# Patient Record
Sex: FEMALE | Race: WHITE
[De-identification: names, ages, dates, MRNs, and addresses within clinical notes are randomized per-mention and may not be internally consistent; named-entity substitution may affect disease eponyms.]

---

## 2017-06-17 ENCOUNTER — HOSPITAL ENCOUNTER (EMERGENCY)
Dept: HOSPITAL 10 - FTE | Age: 32
Discharge: HOME | End: 2017-06-17
Payer: MEDICAID

## 2017-06-17 VITALS
HEART RATE: 64 BPM | TEMPERATURE: 98.3 F | SYSTOLIC BLOOD PRESSURE: 104 MMHG | RESPIRATION RATE: 16 BRPM | DIASTOLIC BLOOD PRESSURE: 58 MMHG

## 2017-06-17 VITALS — BODY MASS INDEX: 34.18 KG/M2 | WEIGHT: 147.71 LBS | HEIGHT: 55 IN

## 2017-06-17 DIAGNOSIS — O20.9: Primary | ICD-10-CM

## 2017-06-17 DIAGNOSIS — O23.41: ICD-10-CM

## 2017-06-17 DIAGNOSIS — Z3A.10: ICD-10-CM

## 2017-06-17 DIAGNOSIS — R10.2: ICD-10-CM

## 2017-06-17 LAB
ADD SCAN DIFF: NO
ADD UMIC: YES
BACTERIA #/AREA URNS HPF: (no result) /[HPF]
BASOPHILS # BLD AUTO: 0 10^3/UL (ref 0–0.1)
BASOPHILS NFR BLD: 0.2 % (ref 0–2)
COLOR UR: (no result)
EOSINOPHIL # BLD: 0.1 10^3/UL (ref 0–0.5)
EOSINOPHIL NFR BLD: 1.1 % (ref 0–7)
ERYTHROCYTE [DISTWIDTH] IN BLOOD BY AUTOMATED COUNT: 11.5 % (ref 11.5–14.5)
GLUCOSE UR STRIP-MCNC: NEGATIVE %
HCT VFR BLD CALC: 37.5 % (ref 37–47)
HGB BLD-MCNC: 13.3 G/DL (ref 12–16)
KETONES UR STRIP.AUTO-MCNC: NEGATIVE MG/DL
LYMPHOCYTES # BLD AUTO: 3.3 10^3/UL (ref 0.8–2.9)
LYMPHOCYTES NFR BLD AUTO: 27.9 % (ref 15–51)
MCH RBC QN AUTO: 33.3 PG (ref 29–33)
MCHC RBC AUTO-ENTMCNC: 35.5 G/DL (ref 32–37)
MCV RBC AUTO: 93.8 FL (ref 82–101)
MONOCYTES # BLD: 1 10^3/UL (ref 0.3–0.9)
MONOCYTES NFR BLD: 8.5 % (ref 0–11)
NEUTROPHILS # BLD: 7.3 10^3/UL (ref 1.6–7.5)
NEUTROPHILS NFR BLD AUTO: 61.7 % (ref 39–77)
NITRITE UR QL STRIP.AUTO: NEGATIVE
NRBC # BLD MANUAL: 0 10^3/UL (ref 0–0)
NRBC BLD QL: 0 /100WBC (ref 0–0)
PLATELET # BLD: 246 10^3/UL (ref 140–415)
PMV BLD AUTO: 10.2 FL (ref 7.4–10.4)
RBC # BLD AUTO: 4 10^6/UL (ref 4.2–5.4)
RBC # UR AUTO: (no result) /UL
RBC #/AREA URNS HPF: (no result) /HPF
UR BILIRUBIN (DIP): NEGATIVE
UR CLARITY: CLEAR
UR TOTAL PROTEIN (DIP): NEGATIVE
UROBILINOGEN UR STRIP-ACNC: (no result) (ref 0.1–1)
WBC # BLD AUTO: 11.8 10^3/UL (ref 4.8–10.8)
WBC # UR STRIP: (no result) /UL

## 2017-06-17 PROCEDURE — 86901 BLOOD TYPING SEROLOGIC RH(D): CPT

## 2017-06-17 PROCEDURE — 85025 COMPLETE CBC W/AUTO DIFF WBC: CPT

## 2017-06-17 PROCEDURE — 76817 TRANSVAGINAL US OBSTETRIC: CPT

## 2017-06-17 PROCEDURE — 81001 URINALYSIS AUTO W/SCOPE: CPT

## 2017-06-17 PROCEDURE — 86900 BLOOD TYPING SEROLOGIC ABO: CPT

## 2017-06-17 PROCEDURE — 84702 CHORIONIC GONADOTROPIN TEST: CPT

## 2017-06-17 NOTE — RADRPT
PROCEDURE:   US OB. 

 

CLINICAL INDICATION:   Pelvic pain  

 

TECHNIQUE:   Transabdominal and transvaginal views of the pelvis are available for review. 

 

COMPARISON:   No prior studies are available for comparison. 

 

FINDINGS:

Within the uterus, there is a single, live, intrauterine pregnancy.

Crown-rump length:2.8 cm 

Fetal heart rate:161 beats per minute 

Mean gestational sac diameter:  5.18 cm

Ultrasound estimated gestational age:10 weeks 4 days

The estimated date of delivery is January 9, 2018 

No ovarian or adnexal mass lesion is seen. There is no free fluid.  

 

IMPRESSION:

 

1.  Single live intrauterine pregnancy with an estimated gestational age of 10 weeks 4 days.  The es
timated date of delivery is January 9, 2018.  No acute abnormality is present.

 

RPTAT: EE.

_____________________________________________ 

.Maria Luisa Dozier MD, MD           Date    Time 

Electronically viewed and signed by .Maria Luisa Dozier MD, MD on 06/17/2017 20:31 

 

D:  06/17/2017 20:31  T:  06/17/2017 20:31

.F/

## 2017-06-17 NOTE — ERD
ER Documentation


Chief Complaint


Date/Time


DATE: 17 


TIME: 18:38


Chief Complaint


PELVIC PAIN 8 WEEKS PREG





HPI


This is a 32-year-old female who presents to the emergency department today 

complaining of pelvic pain for the past 3 hours and some spotting that started 

this morning.  Patient states she has not taken any medication for the pain.  

States she is approximately 7-8 weeks pregnant.  States that she went to her 

clinic this morning was referred here for further evaluation.  States she has 

some nausea.  Denies any fevers or chills.





ROS


All systems reviewed and are negative except as per history of present illness.





Medications


Home Meds


Active Scripts


Ondansetron Hcl* (Zofran*) 4 Mg Tablet, 4 MG PO Q6H for NAUSEA AND/OR VOMITING, 

#30 TAB


   Prov:NEWTON WOODALL PA-C         17


Acetaminophen* (Tylophen*) 500 Mg Capsule, 1 CAP PO Q6H Y for PAIN AND OR 

ELEVATED TEMP, #30 CAP


   Prov:NEWTON WOODALL PA-C         17


Cephalexin* (Keflex*) 500 Mg Capsule, 500 MG PO QID for 5 Days, CAP


   Prov:NEWOTN WOODALL PA-C         17





PMhx/Soc


Medical and Surgical Hx:  pt denies Medical Hx, pt denies Surgical Hx


Hx Alcohol Use:  No


Hx Substance Use:  No


Hx Tobacco Use:  No


Smoking Status:  Never smoker





Physical Exam


Vitals





Vital Signs








  Date Time  Temp Pulse Resp B/P Pulse Ox O2 Delivery O2 Flow Rate FiO2


 


17 15:18 98.1 79 18 104/66 99   








Physical Exam


Const:     No acute distress


Head:   Atraumatic 


Eyes:    Normal Conjunctiva


ENT:    Normal External Ears, Nose and Mouth.


Neck:               Full range of motion..~ No meningismus.


Resp:    Clear to auscultation bilaterally


Cardio:    Regular rate and rhythm, no murmurs


Abd:    Soft, suprapubic tenderness non distended. Normal bowel sounds.  No 

right lower quadrant pain.  No specific tenderness at McBurney's.


Skin:    No petechiae or rashes


Back:    No midline or flank tenderness.  No CVA tenderness.


Ext:    No cyanosis, or edema


Neur:    Awake and alert


Psych:    Normal Mood and Affect





Procedures/MDM


This is a  32-year-old female who presents to the emergency department 

today complaining of some vaginal spotting and pelvic pain that started earlier 

today.  Patient was referred here by her clinic clinic at [facility] for further 

evaluation.  Given patient's symptoms and history I did obtain a complete OB 

workup.





Laboratory work shows a mildly elevated white blood cell count.  She is not 

anemic.  Platelets are within normal limits.


UA shows moderate leukocyte esterase and moderate blood.  Patient will be given 

a prescription for Keflex to treat a urinary tract infection.


Rh status is pending at time of signout


Beta quant hCG is pending at time of signout


Ultrasound is pending at time of signout





Patient symptoms at this time is consistent with vaginal bleeding in early 

pregnancy as well as urinary tract infection.  Patient will be signed out to 

Danya Downs NP pending results of Rh status, beta quant hCG and ultrasound.





Patient was given Tylenol, Zofran here in the emergency department.  I will 

give her prescription for Tylenol, Zofran and Keflex for home.





Any further orders placed will be placed by Danya Downs NP or the attending 

physician.





Departure


Diagnosis:  


 Primary Impression:  


 Vaginal bleeding in pregnant patient at less than 20 weeks gestation


 Additional Impression:  


 UTI (urinary tract infection)


 Urinary tract infection type:  site unspecified  Hematuria presence:  with 

hematuria  Qualified Code:  N39.0 - Urinary tract infection with hematuria, 

site unspecified


Condition:  NEWTON Silva PA-C 2017 18:42

## 2017-10-01 ENCOUNTER — HOSPITAL ENCOUNTER (OUTPATIENT)
Dept: HOSPITAL 10 - OBT | Age: 32
Discharge: HOME | End: 2017-10-01
Attending: OBSTETRICS & GYNECOLOGY
Payer: MEDICAID

## 2017-10-01 VITALS
BODY MASS INDEX: 30.43 KG/M2 | HEIGHT: 62 IN | WEIGHT: 165.35 LBS | HEIGHT: 62 IN | WEIGHT: 165.35 LBS | BODY MASS INDEX: 30.43 KG/M2

## 2017-10-01 VITALS — SYSTOLIC BLOOD PRESSURE: 102 MMHG | DIASTOLIC BLOOD PRESSURE: 55 MMHG | HEART RATE: 73 BPM | RESPIRATION RATE: 18 BRPM

## 2017-10-01 DIAGNOSIS — Z3A.27: ICD-10-CM

## 2017-10-01 DIAGNOSIS — O21.0: Primary | ICD-10-CM

## 2017-10-01 LAB
ADD UMIC: YES
ALBUMIN SERPL-MCNC: 3.3 G/DL (ref 3.3–4.9)
ALBUMIN/GLOB SERPL: 1.13 {RATIO}
ALP SERPL-CCNC: 66 IU/L (ref 42–121)
ALT SERPL-CCNC: 27 IU/L (ref 13–69)
ANION GAP SERPL CALC-SCNC: 10 MMOL/L (ref 8–16)
AST SERPL-CCNC: 16 IU/L (ref 15–46)
BACTERIA #/AREA URNS HPF: (no result) /HPF
BASOPHILS # BLD AUTO: 0 10^3/UL (ref 0–0.1)
BASOPHILS NFR BLD: 0.1 % (ref 0–2)
BILIRUB DIRECT SERPL-MCNC: 0 MG/DL (ref 0–0.2)
BILIRUB SERPL-MCNC: 0.3 MG/DL (ref 0.2–1.3)
BUN SERPL-MCNC: 6 MG/DL (ref 7–20)
CALCIUM SERPL-MCNC: 8.3 MG/DL (ref 8.4–10.2)
CHLORIDE SERPL-SCNC: 108 MMOL/L (ref 97–110)
CO2 SERPL-SCNC: 24 MMOL/L (ref 21–31)
COLOR UR: (no result)
CREAT SERPL-MCNC: 0.5 MG/DL (ref 0.44–1)
EOSINOPHIL # BLD: 0.1 10^3/UL (ref 0–0.5)
EOSINOPHIL NFR BLD: 0.8 % (ref 0–7)
ERYTHROCYTE [DISTWIDTH] IN BLOOD BY AUTOMATED COUNT: 12.3 % (ref 11.5–14.5)
GLOBULIN SER-MCNC: 2.9 G/DL (ref 1.3–3.2)
GLUCOSE SERPL-MCNC: 79 MG/DL (ref 70–220)
GLUCOSE UR STRIP-MCNC: NEGATIVE MG/DL
HCT VFR BLD CALC: 33 % (ref 37–47)
HGB BLD-MCNC: 11.4 G/DL (ref 12–16)
KETONES UR STRIP.AUTO-MCNC: NEGATIVE MG/DL
LYMPHOCYTES # BLD AUTO: 2.1 10^3/UL (ref 0.8–2.9)
LYMPHOCYTES NFR BLD AUTO: 19 % (ref 15–51)
MCH RBC QN AUTO: 33.5 PG (ref 29–33)
MCHC RBC AUTO-ENTMCNC: 34.5 G/DL (ref 32–37)
MCV RBC AUTO: 97.1 FL (ref 82–101)
MONOCYTES # BLD: 0.7 10^3/UL (ref 0.3–0.9)
MONOCYTES NFR BLD: 6.6 % (ref 0–11)
NEUTROPHILS # BLD: 8.2 10^3/UL (ref 1.6–7.5)
NEUTROPHILS NFR BLD AUTO: 72.3 % (ref 39–77)
NITRITE UR QL STRIP.AUTO: NEGATIVE MG/DL
NRBC # BLD MANUAL: 0 10^3/UL (ref 0–0)
NRBC BLD AUTO-RTO: 0 /100WBC (ref 0–0)
PLATELET # BLD: 191 10^3/UL (ref 140–415)
PMV BLD AUTO: 10.3 FL (ref 7.4–10.4)
POTASSIUM SERPL-SCNC: 3.5 MMOL/L (ref 3.5–5.1)
PROT SERPL-MCNC: 6.2 G/DL (ref 6.1–8.1)
RBC # BLD AUTO: 3.4 10^6/UL (ref 4.2–5.4)
RBC # UR AUTO: (no result) MG/DL
SODIUM SERPL-SCNC: 138 MMOL/L (ref 135–144)
SQUAMOUS #/AREA URNS HPF: (no result) /HPF
UR ASCORBIC ACID: NEGATIVE MG/DL
UR BILIRUBIN (DIP): NEGATIVE MG/DL
UR CLARITY: (no result)
UR PH (DIP): 7 (ref 5–9)
UR RBC: 1 /HPF (ref 0–5)
UR SPECIFIC GRAVITY (DIP): 1 (ref 1–1.03)
UR TOTAL PROTEIN (DIP): NEGATIVE MG/DL
UROBILINOGEN UR STRIP-ACNC: NEGATIVE MG/DL
WBC # BLD AUTO: 11.3 10^3/UL (ref 4.8–10.8)
WBC # UR STRIP: (no result) LEU/UL

## 2017-10-01 PROCEDURE — 87086 URINE CULTURE/COLONY COUNT: CPT

## 2017-10-01 PROCEDURE — 81001 URINALYSIS AUTO W/SCOPE: CPT

## 2017-10-01 PROCEDURE — 85025 COMPLETE CBC W/AUTO DIFF WBC: CPT

## 2017-10-01 PROCEDURE — 80053 COMPREHEN METABOLIC PANEL: CPT

## 2017-10-01 PROCEDURE — 76818 FETAL BIOPHYS PROFILE W/NST: CPT

## 2017-10-01 PROCEDURE — G0463 HOSPITAL OUTPT CLINIC VISIT: HCPCS

## 2017-10-01 PROCEDURE — 76817 TRANSVAGINAL US OBSTETRIC: CPT

## 2017-10-01 NOTE — TRIAGE
===================================

OB Triage

===================================

Datetime Report Generated by CPN: 10/01/2017 08:15

   

   

===========================

Datetime: 10/01/2017 07:13

===========================

   

   

===================================

Maternal Assessment

===================================

   

 Level of Consciousness:  Fully Conscious

 DTR's/Clonus:  DTRs 2+; No Clonus

 Headache:  Denies

 Blurred Vision:  No

 Respiratory Effort:  Unlabored; Regular Rhythm; Equal Expansion

 Breath Sounds, Left:  Clear and Equal

 Breath Sounds, Right:  Clear and Equal

 Nausea/Vomiting:  Denies

 RUQ Epigastric Pain:  Denies

 Facial Edema:  None

   

===================================

Fall Risk Assessment

===================================

   

 History of Falling:  (0) No

 Secondary Diagnosis:  (0) No

 Ambulatory Aid:  (0) Bedrest/Nurse Assist

 Gait:  (0) Normal/Bedrest/Immobile

 Mental Status:  (0) Oriented to Own Ability

   

===========================

Datetime: 10/01/2017 07:10

===========================

   

   

===================================

Maternal Assessment

===================================

   

 Level of Consciousness:  Fully Conscious

 DTR's/Clonus:  DTRs 2+

 Headache:  Denies

 Blurred Vision:  No

 Nausea/Vomiting:  Denies

 RUQ Epigastric Pain:  Denies

 Facial Edema:  None

   

===================================

Labor Evaluation

===================================

   

 Frequency:  NONE

 Pattern:  Normal: <= 5 Contractions in 10 Minutes

   

===================================

Fetal Heart Rate

===================================

   

 FHR Baseline Rate:  140

 Monitor Mode:  External US

 FHR Baseline Changes:  No Baseline Change

 Variability:  Moderate 6-25 bpm

 Accelerations:  15X15

 Decelerations:  None

 Category:  Category I

   

===================================

Pain Assessment

===================================

   

 Pain Scale:  0

 Pain Presence:  None/Denies

 Pain Goal:  0

   

===================================

Vaginal Exam

===================================

   

 Membrane Status:  Intact

   

===========================

Datetime: 10/01/2017 06:37

===========================

   

 Stage of Pregnancy:  OB Triage

   

===========================

Datetime: 10/01/2017 06:23

===========================

   

   

===================================

Maternal Assessment

===================================

   

 Level of Consciousness:  Fully Conscious

 DTR's/Clonus:  DTRs 2+; No Clonus

 Headache:  Denies

 Blurred Vision:  No

 Respiratory Effort:  Unlabored; Regular Rhythm; Equal Expansion

 Breath Sounds, Left:  Clear and Equal

 Breath Sounds, Right:  Clear and Equal

 Nausea/Vomiting:  Denies

 RUQ Epigastric Pain:  Denies

 Lower Extremities Edema:  None

     Degree:  None

 Upper Extremities Edema:  None

     Degree:  None

 Facial Edema:  None

   

===================================

Fall Risk Assessment

===================================

   

 History of Falling:  (0) No

 Secondary Diagnosis:  (0) No

 Ambulatory Aid:  (0) Bedrest/Nurse Assist

 IV Therapy:  (0) No

 Gait:  (0) Normal/Bedrest/Immobile

 Mental Status:  (0) Oriented to Own Ability

 Fall Score:  0

 Fall Risk Score Definition:  No Risk: No action required

   

===========================

Datetime: 10/01/2017 06:07

===========================

   

 Time of Arrival:  10/01/2017 05:42

 EGA:  27.1

 Arrived By:  Wheelchair

 Arrived From:  Home

 Chief Complaint:  N/V, pressure

 Fetal Movement:  Present

 Contractions:  Denies/Absent

 Rupture of Membranes:  Denies

 Vaginal Bleeding:  None

 Vaginal Discharge:  Denies

 Recent Sexual Intercouse:  Denies

 Abdominal Trauma:  Not Applicable

 Patient Complaints:  Nausea; Vomiting; Other

 Time Provider Notified:  10/01/2017 08:00

 Provider Notified:  DR MANZO

 Initial Plan:  EFM X1 (Annotations: Data stored by CPN on behalf of user)

## 2017-10-01 NOTE — RADRPT
PROCEDURE:   Biophysical profile. 

 

CLINICAL INDICATION:   Pelvic pain. 

 

TECHNIQUE:    Multiple sonographic images of the pelvis were obtained with transabdominal technique.
  Endovaginal evaluation of the cervix was also performed.

 

COMPARISON:   06/17/2017. 

 

FINDINGS:

There is a single living intrauterine gestation with the fetus in a vertex position.  The placenta i
s anterior in location, grade 1 to 2. Fetal heart tones of 136 beats per minute are identified.  The
re is normal amniotic fluid volume with an DOUGIE of 13.4 cm. The cervix is closed measuring 4.2 cm.

 

Fetal breathing movements = 2

Gross body movements      = 2

Fetal tone                           = 2

Qualitative AFV                  = 2

 

IMPRESSION:

Biophysical profile 8 out of 8.

_____________________________________________ 

.Barron Dia MD, MD           Date    Time 

Electronically viewed and signed by .Barron Dia MD, MD on 10/01/2017 08:09 

 

D:  10/01/2017 08:09  T:  10/01/2017 08:09

.T/

## 2017-10-01 NOTE — QN
Documentation


Comment


 iup 27 weeks


co of n/vx1


vss


exam wnl


labs us wnl


a/p NV improved


dc home











NATALEE MANZO MD Oct 1, 2017 18:47

## 2017-11-03 ENCOUNTER — HOSPITAL ENCOUNTER (OUTPATIENT)
Dept: HOSPITAL 10 - OBT | Age: 32
LOS: 1 days | Discharge: HOME | End: 2017-11-04
Attending: OBSTETRICS & GYNECOLOGY
Payer: MEDICAID

## 2017-11-03 VITALS — BODY MASS INDEX: 30.69 KG/M2 | WEIGHT: 171.08 LBS | HEIGHT: 62.5 IN

## 2017-11-03 VITALS — DIASTOLIC BLOOD PRESSURE: 63 MMHG | SYSTOLIC BLOOD PRESSURE: 105 MMHG | HEART RATE: 75 BPM | RESPIRATION RATE: 18 BRPM

## 2017-11-03 DIAGNOSIS — Z3A.31: ICD-10-CM

## 2017-11-03 LAB
ADD UMIC: YES
COLOR UR: YELLOW
GLUCOSE UR STRIP-MCNC: NEGATIVE MG/DL
KETONES UR STRIP.AUTO-MCNC: (no result) MG/DL
MUCOUS THREADS #/AREA URNS HPF: (no result) /HPF
NITRITE UR QL STRIP.AUTO: NEGATIVE MG/DL
RBC # UR AUTO: NEGATIVE MG/DL
SQUAMOUS #/AREA URNS HPF: (no result) /HPF
UR ASCORBIC ACID: NEGATIVE MG/DL
UR BILIRUBIN (DIP): NEGATIVE MG/DL
UR CLARITY: CLEAR
UR PH (DIP): 6 (ref 5–9)
UR RBC: 1 /HPF (ref 0–5)
UR SPECIFIC GRAVITY (DIP): 1.02 (ref 1–1.03)
UR TOTAL PROTEIN (DIP): NEGATIVE MG/DL
UROBILINOGEN UR STRIP-ACNC: NEGATIVE MG/DL
WBC # UR STRIP: (no result) LEU/UL

## 2017-11-03 PROCEDURE — 81001 URINALYSIS AUTO W/SCOPE: CPT

## 2017-11-03 PROCEDURE — 82731 ASSAY OF FETAL FIBRONECTIN: CPT

## 2017-11-03 PROCEDURE — 76818 FETAL BIOPHYS PROFILE W/NST: CPT

## 2017-11-03 PROCEDURE — 76815 OB US LIMITED FETUS(S): CPT

## 2017-11-03 PROCEDURE — 84112 EVAL AMNIOTIC FLUID PROTEIN: CPT

## 2017-11-03 PROCEDURE — 76817 TRANSVAGINAL US OBSTETRIC: CPT

## 2017-11-03 PROCEDURE — G0463 HOSPITAL OUTPT CLINIC VISIT: HCPCS

## 2017-11-03 NOTE — RADRPT
PROCEDURE:  Obstetrical ultrasound.

 

CLINICAL INDICATION:   Pregnancy, fetal evaluation. Pelvic pain.

 

TECHNIQUE:   Transabdominal sonographic images of the pregnant uterus obtained after first trimester
, greater than 14 weeks gestation. Single intrauterine gestation present.

 

COMPARISON:   06/17/2017 

 

FINDINGS:

Single intrauterine gestation.  

There is a cephalic presentation. 

 

Measurements were made in order to determine fetal age. The results are as follows:

BPD = 29 weeks 4 day(s)

HC = 29 weeks 3 day(s)

AC = 29 weeks 0 day(s)

FL = 29 weeks 6 day(s)

 

DOUGIE = 19.5 cm

Heart rate = 163 beats per minute

 

The placenta is anterior. There is no evidence for an abruption or placenta previa. Small marginal p
lacenta lake measuring 1.6 cm is noted.

 

Ovaries are not visualized. 

 

IMPRESSION:

Single intrauterine gestation of approximately 29 weeks 3 days by ultrasound criteria.  

[place] estimated fetal weight = 1382 g; <3 percentile for gestational age of 31 weeks 5 days.

Recommend reconfirmation of gestational age, findings concerning for intrauterine growth restriction
.

 

RPTAT: AADD

_____________________________________________ 

.Parag Mckeon MD, MD           Date    Time 

Electronically viewed and signed by .Parag Mckeon MD, MD on 11/03/2017 23:13 

 

D:  11/03/2017 23:13  T:  11/03/2017 23:13

.B/

## 2017-11-03 NOTE — RADRPT
PROCEDURE:   Obstetrical ultrasound for biophysical profile 

 

CLINICAL INDICATION:  Biophysical profile.  Pregnancy.  

 

TECHNIQUE:   Obstetrical ultrasound of the pregnant uterus for biophysical profile.  Transabdominal 
and transvaginal views are obtained.

 

COMPARISON:   10/01/2017

 

FINDINGS:

Single intrauterine gestation.

 

Presentation: Cephalic.

 

Placenta: Anterior. 

No evidence of placental abruption.

No evidence of placenta previa.

1.5 cm marginal placental leak is noted.

Cervix is closed measuring 4.3 cm as visualized transvaginally.

 

Fetal breathing movement = 2/2

Fetal tone = 2/2

Fetal motion = 2/2

DOUGIE = 2/2

 

DOUGIE = 19.5 cm

Fetal heart rate: 146 beats per minute

 

 

IMPRESSION:

 

Single intrauterine gestation.

Biophysical profile  8/8  

 

 

RPTAT: AADD

_____________________________________________ 

.Parag Mckeon MD, MD           Date    Time 

Electronically viewed and signed by .Parag Mckeon MD, MD on 11/03/2017 23:08 

 

D:  11/03/2017 23:08  T:  11/03/2017 23:08

.B/

## 2017-11-04 NOTE — TRIAGE
===================================

OB Triage

===================================

Datetime Report Generated by CPN: 2017 01:19

   

   

===========================

Datetime: 2017 21:32

===========================

   

 Stage of Pregnancy:  OB Triage

   

===================================

Maternal Assessment

===================================

   

 Level of Consciousness:  Fully Conscious

 Headache:  Denies

 Blurred Vision:  No

 Respiratory Effort:  Unlabored

 Nausea/Vomiting:  Denies

 RUQ Epigastric Pain:  Denies

 Facial Edema:  None

   

===================================

Labor Evaluation

===================================

   

 Frequency:  placed

 Monitor Mode:  External

 Resting Tone [place]:  Relaxed

   

===================================

Fetal Heart Rate

===================================

   

 FHR Baseline Rate:  145

 Monitor Mode:  External US

   

===================================

Pain Assessment

===================================

   

 Pain Scale:  6

 Pain Presence:  Intermittent

 Pain Type:  Cramping

 Pain Location:  Abdomen; Back

   

===========================

Datetime: 10/01/2017 08:05

===========================

   

 Time of Arrival:  2017 21:12

 EGA:  31.6

 Chief Complaint:  [id number] sent from clinic w/ c/o occas lower back and abd pain x1 wk and watery d
ischarge x 5 days

 Fetal Movement:  Present

 Contractions:  Irregular

 Rupture of Membranes:  Unsure

 Vaginal Bleeding:  None

 Vaginal Discharge:  Present

 Recent Sexual Intercouse:  Denies

 Abdominal Trauma:  Not Applicable

 Patient Complaints:  Cramping; Back Pain

 Additional Patient Complaints:  Pt states drinks little water and today only fluid intake was 2 sod
as

 Time Provider Notified:  2017 22:17

 Provider Notified:  Dr Owens

 Initial Plan:  EFM,ROM+,CVL.EFW,BPP,UA

   

===========================

Datetime: 10/01/2017 06:23

===========================

   

   

===================================

Fall Risk Assessment

===================================

   

 Fall Score:  0

 Fall Risk Score Definition:  No Risk: No action required

   

===========================

Datetime: 10/01/2017 06:07

===========================

   

 EGA:  27.1

## 2017-11-04 NOTE — PN
Triage Information


Date/Time


2017


Reason for visit:  Uterine contractions


Weeks of Gestation


31w 6d


/Para


5/3


Diabetes:  none


Hypertention:  none


Additional information


Pt c/o back and LAP x 1 week very occasionally but when present the pain is 6/

10. Pt also reports watery d/c x 5 days.


PMHx: none.


PSHx: none.


NKDA.





Objective





 Vital Signs








  Date Time  Temp Pulse Resp B/P Pulse Ox O2 Delivery O2 Flow Rate FiO2


 


11/3/17 21:55 98.5 75 18 105/63  Room Air  








Fetal Heart Rate:  140's


Fetal Heart Rate Comments


Accels to 170 bpm. No decels.


Contractions:  None


Exam


Deferred





Results/Medications


Results 24 hrs





Laboratory Tests








Test


  11/3/17


22:10 11/3/17


22:25


 


Urine Color YELLOW   


 


Urine Clarity CLEAR   


 


Urine pH 6.0   


 


Urine Specific Gravity 1.021   


 


Urine Ketones TRACE  A 


 


Urine Nitrite NEGATIVE   


 


Urine Bilirubin NEGATIVE   


 


Urine Urobilinogen NEGATIVE   


 


Urine Leukocyte Esterase TRACE  A 


 


Urine Microscopic RBC 1   


 


Urine Microscopic WBC 3   


 


Urine Squamous Epithelial


Cells FEW  


  


 


 


Urine Mucus FEW  A 


 


Urine Hemoglobin NEGATIVE   


 


Urine Glucose NEGATIVE   


 


Urine Total Protein NEGATIVE   


 


Fetal Membranes Rupture  NEGATIVE  


 


Fetal Fibronectin  NEGATIVE  








Imaging Results


EFW 1382 grams. VTX. Anterior placenta. CX 4.3 cm. BPP 8/8. DOUGIE 19.5 cm.


Disposition:  Discharge


Assessment/Plan


A: IUP at 31w 6d.


False labor.


P: D/C home. 


F/U with the clinic in 2 weeks as scheduled.


PTL precautions reviewed and pt encouraged to drink more water.











MICHAEL DUNCAN MD 2017 00:20

## 2017-12-01 ENCOUNTER — HOSPITAL ENCOUNTER (OUTPATIENT)
Dept: HOSPITAL 10 - OBT | Age: 32
Discharge: HOME | End: 2017-12-01
Attending: OBSTETRICS & GYNECOLOGY
Payer: MEDICAID

## 2017-12-01 VITALS
WEIGHT: 171.74 LBS | BODY MASS INDEX: 31.6 KG/M2 | HEIGHT: 62 IN | HEIGHT: 62 IN | WEIGHT: 171.74 LBS | BODY MASS INDEX: 31.6 KG/M2

## 2017-12-01 DIAGNOSIS — Z3A.35: ICD-10-CM

## 2017-12-01 PROCEDURE — G0463 HOSPITAL OUTPT CLINIC VISIT: HCPCS

## 2017-12-01 PROCEDURE — 76818 FETAL BIOPHYS PROFILE W/NST: CPT

## 2017-12-01 NOTE — RADRPT
PROCEDURE:   Obstetrical ultrasound for biophysical profile 

 

CLINICAL INDICATION:  Biophysical profile.  Pregnancy.  

 

TECHNIQUE:   Obstetrical ultrasound of the pregnant uterus for biophysical profile.  Transabdominal 
views are obtained.

 

COMPARISON:   US PELVIS 11/3/2017

 

FINDINGS:

Single intrauterine gestation.

 

Presentation: Cephalic.

 

Placenta: Anterior. 

No evidence of placental abruption.

No evidence of placenta previa.

 

Fetal breathing movement = 2/2

Fetal tone = 2/2

Fetal motion = 2/2

DOUGIE = 2/2

 

DOUGIE = 16.5 cm

Fetal heart rate: 137 beats per minute

 

IMPRESSION:

 

Single intrauterine gestation.

Biophysical profile  8/8  

 

 

RPTAT: AADD

_____________________________________________ 

.Parag Mckeon MD, MD           Date    Time 

Electronically viewed and signed by .Parag Mckeon MD, MD on 12/01/2017 13:45 

 

D:  12/01/2017 13:45  T:  12/01/2017 13:45

.B/

## 2017-12-01 NOTE — PN
Triage Information


Date/Time





Reason for visit:  Uterine contractions


Weeks of Gestation


35+


/Para


4/1


Diabetes:  none


Hypertention:  none





Objective


Fetal Heart Rate:  140's


Contractions:  >10 Minutes Apart


Disposition:  Discharge


Assessment/Plan


occasional CTXs


No cervical change


Discharged with precautions











MARVIN ORTEGA M.D. Dec 1, 2017 14:17

## 2017-12-01 NOTE — TRIAGE
===================================

OB Triage

===================================

Datetime Report Generated by CPN: 12/01/2017 14:49

   

   

===========================

Datetime: 12/01/2017 14:08

===========================

   

   

===================================

Vaginal Exam

===================================

   

 Dilatation (cms):  0.0

 Effacement (%):  0

 Station:  -3

 Exam By:  Supriya RN

   

===========================

Datetime: 12/01/2017 14:00

===========================

   

   

===================================

Labor Evaluation

===================================

   

 Frequency:  Irregular

 Monitor Mode:  External

 Quality:  Mild

 Pattern:  Normal: <= 5 Contractions in 10 Minutes

 Resting Tone [place]:  Relaxed

   

===================================

Fetal Heart Rate

===================================

   

 FHR Baseline Rate:  120

 Monitor Mode:  External US

 FHR Baseline Changes:  No Baseline Change

 Variability:  Moderate 6-25 bpm

 Accelerations:  15X15

 Decelerations:  None

 Category:  Category I

   

===================================

Pain Assessment

===================================

   

 Pain Scale:  3

 Pain Presence:  Intermittent

 Pain Type:  Ache

 Pain Location:  Abdomen

 Pain Goal:  0

 Pain Relief Measures:  Comfort Measures

   

===========================

Datetime: 12/01/2017 13:00

===========================

   

   

===================================

Labor Evaluation

===================================

   

 Frequency:  Irregular

 Monitor Mode:  External

 Quality:  Mild

 Pattern:  Normal: <= 5 Contractions in 10 Minutes

 Resting Tone [place]:  Relaxed

   

===================================

Fetal Heart Rate

===================================

   

 FHR Baseline Rate:  120

 Monitor Mode:  External US

 FHR Baseline Changes:  No Baseline Change

 Variability:  Moderate 6-25 bpm

 Accelerations:  15X15

 Decelerations:  None

 Category:  Category I

   

===================================

Pain Assessment

===================================

   

 Pain Scale:  4

 Pain Presence:  Intermittent

 Pain Type:  Ache

 Pain Location:  Abdomen

 Pain Goal:  0

 Pain Relief Measures:  Comfort Measures

   

===========================

Datetime: 12/01/2017 11:56

===========================

   

 Stage of Pregnancy:  OB Triage

   

===========================

Datetime: 12/01/2017 11:45

===========================

   

 Assessment Type:  Triage

   

===================================

Maternal Assessment

===================================

   

 Level of Consciousness:  Fully Conscious

 DTR's/Clonus:  DTRs 2+; No Clonus

 Headache:  Denies

 Blurred Vision:  No

 Respiratory Effort:  Unlabored; Regular Rhythm; Equal Expansion

 Breath Sounds, Left:  Clear and Equal

 Breath Sounds, Right:  Clear and Equal

 Nausea/Vomiting:  Denies

 RUQ Epigastric Pain:  Denies

 Lower Extremities Edema:  Bilateral Lower Extremities

     Degree:  Trace

 Upper Extremities Edema:  None

     Degree:  None

 Facial Edema:  None

   

===================================

Fall Risk Assessment

===================================

   

 History of Falling:  (0) No

 Secondary Diagnosis:  (0) No

 Ambulatory Aid:  (0) Bedrest/Nurse Assist

 IV Therapy:  (0) No

 Gait:  (0) Normal/Bedrest/Immobile

 Mental Status:  (0) Oriented to Own Ability

 Fall Score:  0

 Fall Risk Score Definition:  No Risk: No action required

   

===========================

Datetime: 12/01/2017 11:30

===========================

   

 Time of Arrival:  12/01/2017 10:47

 EGA:  35.6

 Arrived By:  Ambulatory

 Arrived From:  Home

 Chief Complaint:  Lower abdomen pain

 Fetal Movement:  Present

 Contractions:  Denies/Absent

 Rupture of Membranes:  Denies

 Vaginal Bleeding:  None

 Vaginal Discharge:  Denies

 Recent Sexual Intercouse:  Denies

 Abdominal Trauma:  Not Applicable

 Patient Complaints:  None

 Time Provider Notified:  12/01/2017 12:30

 Provider Notified:  [place]

 Initial Plan:  NST, BPP, VE

   

===========================

Datetime: 11/04/2017 00:10

===========================

   

 Stage of Pregnancy:  OB Triage

 FHR Baseline Changes:  No Baseline Change

 Variability:  Moderate 6-25 bpm

 Accelerations:  15X15

 Decelerations:  None

   

===========================

Datetime: 11/03/2017 23:20

===========================

   

 Stage of Pregnancy:  OB Triage

   

===================================

Fetal Heart Rate

===================================

   

 FHR Baseline Rate:  140

 Monitor Mode:  External US

   

===========================

Datetime: 11/03/2017 22:40

===========================

   

 Stage of Pregnancy:  OB Triage

 Amniotic Fluid Amount:  None

 Pool:  Negative

   

===========================

Datetime: 11/03/2017 22:16

===========================

   

 Stage of Pregnancy:  OB Triage

 Monitor Mode:  External

 Quality:  Mild

 Pattern:  Normal: <= 5 Contractions in 10 Minutes

 Resting Tone [place]:  Relaxed

   

===================================

Fetal Heart Rate

===================================

   

 FHR Baseline Rate:  140

 Monitor Mode:  External US

 FHR Baseline Changes:  No Baseline Change

 Variability:  Moderate 6-25 bpm

 Accelerations:  15X15

 Decelerations:  None

 Category:  Category I

 Pain Presence:  Intermittent

 Pain Type:  Cramping; Ache

 Pain Location:  Abdomen; Back

   

===========================

Datetime: 10/01/2017 08:05

===========================

   

 EGA:  31.6

   

===========================

Datetime: 10/01/2017 06:23

===========================

   

 Fall Score:  0

 Fall Risk Score Definition:  No Risk: No action required

   

===========================

Datetime: 10/01/2017 06:07

===========================

   

 EGA:  27.1

## 2017-12-27 ENCOUNTER — HOSPITAL ENCOUNTER (OUTPATIENT)
Age: 32
Setting detail: OBSERVATION
LOS: 1 days | Discharge: HOME | End: 2017-12-28

## 2017-12-27 ENCOUNTER — HOSPITAL ENCOUNTER (OUTPATIENT)
Dept: HOSPITAL 91 - L-D | Age: 32
Setting detail: OBSERVATION
LOS: 1 days | Discharge: HOME | End: 2017-12-28
Payer: MEDICAID

## 2017-12-27 DIAGNOSIS — Z23: ICD-10-CM

## 2017-12-27 DIAGNOSIS — O47.1: Primary | ICD-10-CM

## 2017-12-27 LAB
ADD MAN DIFF?: NO
BASOPHIL #: 0 10^3/UL (ref 0–0.1)
BASOPHILS %: 0.3 % (ref 0–2)
EOSINOPHILS #: 0.1 10^3/UL (ref 0–0.5)
EOSINOPHILS %: 0.7 % (ref 0–7)
HEMATOCRIT: 34.4 % (ref 37–47)
HEMOGLOBIN: 11.6 G/DL (ref 12–16)
INR: 0.95
LYMPHOCYTES #: 2.7 10^3/UL (ref 0.8–2.9)
LYMPHOCYTES %: 17.8 % (ref 15–51)
MEAN CORPUSCULAR HEMOGLOBIN: 32.1 PG (ref 29–33)
MEAN CORPUSCULAR HGB CONC: 33.7 G/DL (ref 32–37)
MEAN CORPUSCULAR VOLUME: 95.3 FL (ref 82–101)
MEAN PLATELET VOLUME: 10.3 FL (ref 7.4–10.4)
MONOCYTE #: 1.1 10^3/UL (ref 0.3–0.9)
MONOCYTES %: 7.3 % (ref 0–11)
NEUTROPHIL #: 10.9 10^3/UL (ref 1.6–7.5)
NEUTROPHILS %: 72.7 % (ref 39–77)
NUCLEATED RED BLOOD CELLS #: 0 10^3/UL (ref 0–0)
NUCLEATED RED BLOOD CELLS%: 0 /100WBC (ref 0–0)
PARTIAL THROMBOPLASTIN TIME: 27.5 SEC (ref 25–35)
PLATELET COUNT: 203 10^3/UL (ref 140–415)
PROTIME: 12.8 SEC (ref 11.9–14.9)
PT RATIO: 1
RED BLOOD COUNT: 3.61 10^6/UL (ref 4.2–5.4)
RED CELL DISTRIBUTION WIDTH: 12.6 % (ref 11.5–14.5)
WHITE BLOOD COUNT: 15 10^3/UL (ref 4.8–10.8)

## 2017-12-27 PROCEDURE — 85025 COMPLETE CBC W/AUTO DIFF WBC: CPT

## 2017-12-27 PROCEDURE — 76818 FETAL BIOPHYS PROFILE W/NST: CPT

## 2017-12-27 PROCEDURE — 86901 BLOOD TYPING SEROLOGIC RH(D): CPT

## 2017-12-27 PROCEDURE — 85610 PROTHROMBIN TIME: CPT

## 2017-12-27 PROCEDURE — 86592 SYPHILIS TEST NON-TREP QUAL: CPT

## 2017-12-27 PROCEDURE — 85730 THROMBOPLASTIN TIME PARTIAL: CPT

## 2017-12-27 PROCEDURE — 99217: CPT

## 2017-12-27 PROCEDURE — 86900 BLOOD TYPING SEROLOGIC ABO: CPT

## 2017-12-27 PROCEDURE — 90686 IIV4 VACC NO PRSV 0.5 ML IM: CPT

## 2017-12-27 RX ADMIN — PYRIDOXINE HYDROCHLORIDE 1 MLS/HR: 100 INJECTION, SOLUTION INTRAMUSCULAR; INTRAVENOUS at 17:19

## 2017-12-27 RX ADMIN — PYRIDOXINE HYDROCHLORIDE 1 MLS/HR: 100 INJECTION, SOLUTION INTRAMUSCULAR; INTRAVENOUS at 22:56

## 2017-12-27 RX ADMIN — Medication 1 MLS/HR: at 17:52

## 2017-12-28 LAB — RAPID PLASMA REAGIN: NONREACTIVE

## 2017-12-28 RX ADMIN — PYRIDOXINE HYDROCHLORIDE 1 MLS/HR: 100 INJECTION, SOLUTION INTRAMUSCULAR; INTRAVENOUS at 07:40

## 2017-12-30 ENCOUNTER — HOSPITAL ENCOUNTER (OUTPATIENT)
Age: 32
Discharge: HOME | End: 2017-12-30

## 2017-12-30 ENCOUNTER — HOSPITAL ENCOUNTER (OUTPATIENT)
Dept: HOSPITAL 91 - OBT | Age: 32
Discharge: HOME | End: 2017-12-30
Payer: MEDICAID

## 2017-12-30 DIAGNOSIS — O26.893: Primary | ICD-10-CM

## 2017-12-30 DIAGNOSIS — Z3A.38: ICD-10-CM

## 2017-12-30 DIAGNOSIS — K62.89: ICD-10-CM

## 2017-12-30 DIAGNOSIS — K64.9: ICD-10-CM

## 2017-12-30 PROCEDURE — 76818 FETAL BIOPHYS PROFILE W/NST: CPT

## 2018-01-03 ENCOUNTER — HOSPITAL ENCOUNTER (INPATIENT)
Dept: HOSPITAL 91 - PP1 | Age: 33
LOS: 4 days | Discharge: HOME | End: 2018-01-07
Payer: MEDICAID

## 2018-01-03 ENCOUNTER — HOSPITAL ENCOUNTER (INPATIENT)
Age: 33
LOS: 4 days | Discharge: HOME | End: 2018-01-07

## 2018-01-03 DIAGNOSIS — Z3A.39: ICD-10-CM

## 2018-01-03 LAB
ADD MAN DIFF?: NO
BASOPHIL #: 0 10^3/UL (ref 0–0.1)
BASOPHILS %: 0.3 % (ref 0–2)
EOSINOPHILS #: 0.1 10^3/UL (ref 0–0.5)
EOSINOPHILS %: 0.9 % (ref 0–7)
HEMATOCRIT: 35.5 % (ref 37–47)
HEMOGLOBIN: 12.3 G/DL (ref 12–16)
INR: 0.91
LYMPHOCYTES #: 2.8 10^3/UL (ref 0.8–2.9)
LYMPHOCYTES %: 23.2 % (ref 15–51)
MEAN CORPUSCULAR HEMOGLOBIN: 32.2 PG (ref 29–33)
MEAN CORPUSCULAR HGB CONC: 34.6 G/DL (ref 32–37)
MEAN CORPUSCULAR VOLUME: 92.9 FL (ref 82–101)
MEAN PLATELET VOLUME: 10.6 FL (ref 7.4–10.4)
MONOCYTE #: 0.9 10^3/UL (ref 0.3–0.9)
MONOCYTES %: 7.2 % (ref 0–11)
NEUTROPHIL #: 8.1 10^3/UL (ref 1.6–7.5)
NEUTROPHILS %: 67.3 % (ref 39–77)
NUCLEATED RED BLOOD CELLS #: 0 10^3/UL (ref 0–0)
NUCLEATED RED BLOOD CELLS%: 0 /100WBC (ref 0–0)
PARTIAL THROMBOPLASTIN TIME: 28 SEC (ref 25–35)
PLATELET COUNT: 215 10^3/UL (ref 140–415)
PROTIME: 12.3 SEC (ref 11.9–14.9)
PT RATIO: 1
RED BLOOD COUNT: 3.82 10^6/UL (ref 4.2–5.4)
RED CELL DISTRIBUTION WIDTH: 12.8 % (ref 11.5–14.5)
WHITE BLOOD COUNT: 12 10^3/UL (ref 4.8–10.8)

## 2018-01-03 PROCEDURE — 86901 BLOOD TYPING SEROLOGIC RH(D): CPT

## 2018-01-03 PROCEDURE — 76815 OB US LIMITED FETUS(S): CPT

## 2018-01-03 PROCEDURE — 86900 BLOOD TYPING SEROLOGIC ABO: CPT

## 2018-01-03 PROCEDURE — 85610 PROTHROMBIN TIME: CPT

## 2018-01-03 PROCEDURE — 86592 SYPHILIS TEST NON-TREP QUAL: CPT

## 2018-01-03 PROCEDURE — 86762 RUBELLA ANTIBODY: CPT

## 2018-01-03 PROCEDURE — 85025 COMPLETE CBC W/AUTO DIFF WBC: CPT

## 2018-01-03 PROCEDURE — 90715 TDAP VACCINE 7 YRS/> IM: CPT

## 2018-01-03 PROCEDURE — 62319: CPT

## 2018-01-03 PROCEDURE — 86850 RBC ANTIBODY SCREEN: CPT

## 2018-01-03 PROCEDURE — 85730 THROMBOPLASTIN TIME PARTIAL: CPT

## 2018-01-03 RX ADMIN — PYRIDOXINE HYDROCHLORIDE 1 MLS/HR: 100 INJECTION, SOLUTION INTRAMUSCULAR; INTRAVENOUS at 21:48

## 2018-01-04 LAB — RAPID PLASMA REAGIN: NONREACTIVE

## 2018-01-04 RX ADMIN — OXYTOCIN 1 MLS/HR: 10 INJECTION, SOLUTION INTRAMUSCULAR; INTRAVENOUS at 13:35

## 2018-01-04 RX ADMIN — OXYTOCIN 1 MLS/HR: 10 INJECTION, SOLUTION INTRAMUSCULAR; INTRAVENOUS at 08:24

## 2018-01-04 RX ADMIN — BUTORPHANOL TARTRATE 1 MG: 2 INJECTION, SOLUTION INTRAMUSCULAR; INTRAVENOUS at 10:01

## 2018-01-04 RX ADMIN — OXYTOCIN 1 MLS/HR: 10 INJECTION, SOLUTION INTRAMUSCULAR; INTRAVENOUS at 16:29

## 2018-01-04 RX ADMIN — Medication 1 MLS/HR: at 03:23

## 2018-01-04 RX ADMIN — OXYTOCIN 1 MLS/HR: 10 INJECTION, SOLUTION INTRAMUSCULAR; INTRAVENOUS at 16:58

## 2018-01-04 RX ADMIN — PYRIDOXINE HYDROCHLORIDE 1 MLS/HR: 100 INJECTION, SOLUTION INTRAMUSCULAR; INTRAVENOUS at 06:44

## 2018-01-04 RX ADMIN — OXYTOCIN 1 MLS/HR: 10 INJECTION, SOLUTION INTRAMUSCULAR; INTRAVENOUS at 03:50

## 2018-01-04 RX ADMIN — OXYTOCIN 1 MLS/HR: 10 INJECTION, SOLUTION INTRAMUSCULAR; INTRAVENOUS at 17:21

## 2018-01-05 LAB
RUBELLA ANTIBODY - IGG: 3.76 INDEX
RUBELLA ANTIBODY - IGM: <20 AU/ML

## 2018-01-05 PROCEDURE — 3E033VJ INTRODUCTION OF OTHER HORMONE INTO PERIPHERAL VEIN, PERCUTANEOUS APPROACH: ICD-10-PCS

## 2018-01-05 RX ADMIN — OXYTOCIN 1 MLS/HR: 10 INJECTION, SOLUTION INTRAMUSCULAR; INTRAVENOUS at 05:42

## 2018-01-05 RX ADMIN — IBUPROFEN 1 MG: 600 TABLET ORAL at 18:07

## 2018-01-05 RX ADMIN — AMPICILLIN 1 MLS/HR: 1 INJECTION, POWDER, FOR SOLUTION INTRAMUSCULAR; INTRAVENOUS at 09:45

## 2018-01-05 RX ADMIN — WITCH HAZEL 1 PAD: 500 SOLUTION RECTAL; TOPICAL at 18:07

## 2018-01-05 RX ADMIN — FENTANYL CIT 0.2 MG/100ML-ROPIV 0.2%-NACL 0.9% EPIDURAL INJ 1 ML: 2/0.2 SOLUTION at 02:15

## 2018-01-05 RX ADMIN — GENTAMICIN SULFATE 1 MLS/HR: 80 INJECTION, SOLUTION INTRAVENOUS at 13:35

## 2018-01-05 RX ADMIN — AMPICILLIN 1 MLS/HR: 2 INJECTION, POWDER, FOR SOLUTION INTRAVENOUS at 06:03

## 2018-01-05 RX ADMIN — AMPICILLIN 1 MLS/HR: 1 INJECTION, POWDER, FOR SOLUTION INTRAMUSCULAR; INTRAVENOUS at 14:19

## 2018-01-05 RX ADMIN — Medication 1 SPRAY: at 18:07

## 2018-01-05 RX ADMIN — Medication 1 APPLIC: at 18:07

## 2018-01-05 RX ADMIN — FENTANYL CIT 0.2 MG/100ML-ROPIV 0.2%-NACL 0.9% EPIDURAL INJ 1 ML: 2/0.2 SOLUTION at 09:48

## 2018-01-05 RX ADMIN — GENTAMICIN SULFATE 1 MLS/HR: 80 INJECTION, SOLUTION INTRAVENOUS at 21:51

## 2018-01-05 RX ADMIN — Medication 1 MLS/HR: at 15:09

## 2018-01-05 RX ADMIN — DOCUSATE SODIUM AND SENNOSIDES 1 TAB: 8.6; 5 TABLET, FILM COATED ORAL at 21:05

## 2018-01-05 RX ADMIN — OXYTOCIN 1 MLS/HR: 10 INJECTION, SOLUTION INTRAMUSCULAR; INTRAVENOUS at 00:28

## 2018-01-05 RX ADMIN — OXYTOCIN 1 MLS/HR: 10 INJECTION, SOLUTION INTRAMUSCULAR; INTRAVENOUS at 12:11

## 2018-01-05 RX ADMIN — ACETAMINOPHEN 1 MG: 325 TABLET, FILM COATED ORAL at 13:35

## 2018-01-05 RX ADMIN — OXYTOCIN 1 MLS/HR: 10 INJECTION, SOLUTION INTRAMUSCULAR; INTRAVENOUS at 08:45

## 2018-01-05 RX ADMIN — IBUPROFEN 1 MG: 600 TABLET ORAL at 23:54

## 2018-01-06 LAB
ADD MAN DIFF?: NO
BASOPHIL #: 0 10^3/UL (ref 0–0.1)
BASOPHILS %: 0.1 % (ref 0–2)
EOSINOPHILS #: 0.1 10^3/UL (ref 0–0.5)
EOSINOPHILS %: 0.6 % (ref 0–7)
HEMATOCRIT: 30.8 % (ref 37–47)
HEMOGLOBIN: 10.7 G/DL (ref 12–16)
LYMPHOCYTES #: 2 10^3/UL (ref 0.8–2.9)
LYMPHOCYTES %: 12.2 % (ref 15–51)
MEAN CORPUSCULAR HEMOGLOBIN: 33.2 PG (ref 29–33)
MEAN CORPUSCULAR HGB CONC: 34.7 G/DL (ref 32–37)
MEAN CORPUSCULAR VOLUME: 95.7 FL (ref 82–101)
MEAN PLATELET VOLUME: 10.6 FL (ref 7.4–10.4)
MONOCYTE #: 1 10^3/UL (ref 0.3–0.9)
MONOCYTES %: 5.8 % (ref 0–11)
NEUTROPHIL #: 13.3 10^3/UL (ref 1.6–7.5)
NEUTROPHILS %: 80.6 % (ref 39–77)
NUCLEATED RED BLOOD CELLS #: 0 10^3/UL (ref 0–0)
NUCLEATED RED BLOOD CELLS%: 0 /100WBC (ref 0–0)
PLATELET COUNT: 213 10^3/UL (ref 140–415)
RED BLOOD COUNT: 3.22 10^6/UL (ref 4.2–5.4)
RED CELL DISTRIBUTION WIDTH: 13.2 % (ref 11.5–14.5)
WHITE BLOOD COUNT: 16.5 10^3/UL (ref 4.8–10.8)

## 2018-01-06 RX ADMIN — DOCUSATE SODIUM AND SENNOSIDES 1 TAB: 8.6; 5 TABLET, FILM COATED ORAL at 21:14

## 2018-01-06 RX ADMIN — DOCUSATE SODIUM AND SENNOSIDES 1 TAB: 8.6; 5 TABLET, FILM COATED ORAL at 09:08

## 2018-01-06 RX ADMIN — IBUPROFEN 1 MG: 600 TABLET ORAL at 12:08

## 2018-01-06 RX ADMIN — GENTAMICIN SULFATE 1 MLS/HR: 80 INJECTION, SOLUTION INTRAVENOUS at 05:27

## 2018-01-06 RX ADMIN — IBUPROFEN 1 MG: 600 TABLET ORAL at 17:35

## 2018-01-06 RX ADMIN — IBUPROFEN 1 MG: 600 TABLET ORAL at 05:27

## 2018-01-07 RX ADMIN — IBUPROFEN 1 MG: 600 TABLET ORAL at 00:25

## 2018-01-07 RX ADMIN — DOCUSATE SODIUM AND SENNOSIDES 1 TAB: 8.6; 5 TABLET, FILM COATED ORAL at 09:06

## 2018-01-07 RX ADMIN — CLOSTRIDIUM TETANI TOXOID ANTIGEN (FORMALDEHYDE INACTIVATED), CORYNEBACTERIUM DIPHTHERIAE TOXOID ANTIGEN (FORMALDEHYDE INACTIVATED), BORDETELLA PERTUSSIS TOXOID ANTIGEN (GLUTARALDEHYDE INACTIVATED), BORDETELLA PERTUSSIS FILAMENTOUS HEMAGGLUTININ ANTIGEN (FORMALDEHYDE INACTIVATED), BORDETELLA PERTUSSIS PERTACTIN ANTIGEN, AND BORDETELLA PERTUSSIS FIMBRIAE 2/3 ANTIGEN 1 ML: 5; 2; 2.5; 5; 3; 5 INJECTION, SUSPENSION INTRAMUSCULAR at 09:06

## 2018-01-07 RX ADMIN — IBUPROFEN 1 MG: 600 TABLET ORAL at 05:50

## 2018-01-07 RX ADMIN — IBUPROFEN 1 MG: 600 TABLET ORAL at 12:11

## 2024-08-19 NOTE — EN
Date/Time of Note


Date/Time of Note


DATE: 6/17/17 


TIME: 20:45





ER Progress Note


Patient was signed out to me by Ronda RETANA awaiting Pelvic US, beta Hcg, 

and RH status.  OB ultrasound reviewed by radiologist as single live 

intrauterine pregnancy with an estimated gestational age of 10 weeks 4 days.  

No acute abnormality is present.  Patient's beta quant is [phone].  Patient's 

Rh status is O+.  Patient is alert and oriented and stable for discharge home.  

Patient given discharge instructions and prescriptions per Ronda RETANA 

instructions.  





Return to ED for any high fever, chest pain, difficulty breathing, shortness 

breath, wheezing, vomiting, diarrhea, abdominal pain or any new or worsening 

symptoms. Patient verbalizes understanding. All questions answered at discharge.











JACKSON STANTON NP Jun 17, 2017 20:47
100% of the time/able to follow multistep instructions